# Patient Record
Sex: MALE | Race: WHITE | Employment: FULL TIME | URBAN - METROPOLITAN AREA
[De-identification: names, ages, dates, MRNs, and addresses within clinical notes are randomized per-mention and may not be internally consistent; named-entity substitution may affect disease eponyms.]

---

## 2019-03-19 ENCOUNTER — OFFICE VISIT (OUTPATIENT)
Dept: INTERNAL MEDICINE CLINIC | Age: 54
End: 2019-03-19
Payer: COMMERCIAL

## 2019-03-19 VITALS
HEART RATE: 82 BPM | HEIGHT: 67 IN | WEIGHT: 163 LBS | DIASTOLIC BLOOD PRESSURE: 80 MMHG | BODY MASS INDEX: 25.58 KG/M2 | SYSTOLIC BLOOD PRESSURE: 146 MMHG | RESPIRATION RATE: 16 BRPM

## 2019-03-19 DIAGNOSIS — Z12.11 COLON CANCER SCREENING: ICD-10-CM

## 2019-03-19 DIAGNOSIS — Z12.83 SKIN CANCER SCREENING: ICD-10-CM

## 2019-03-19 DIAGNOSIS — Z00.00 PHYSICAL EXAM: Primary | ICD-10-CM

## 2019-03-19 DIAGNOSIS — I10 ESSENTIAL HYPERTENSION: ICD-10-CM

## 2019-03-19 DIAGNOSIS — Z12.5 PROSTATE CANCER SCREENING: ICD-10-CM

## 2019-03-19 DIAGNOSIS — R10.812 LEFT UPPER QUADRANT ABDOMINAL TENDERNESS WITHOUT REBOUND TENDERNESS: ICD-10-CM

## 2019-03-19 PROCEDURE — 99205 OFFICE O/P NEW HI 60 MIN: CPT | Performed by: NURSE PRACTITIONER

## 2019-03-19 SDOH — HEALTH STABILITY: MENTAL HEALTH: HOW OFTEN DO YOU HAVE A DRINK CONTAINING ALCOHOL?: 2-3 TIMES A WEEK

## 2019-03-19 ASSESSMENT — ENCOUNTER SYMPTOMS
SORE THROAT: 0
VOMITING: 0
DIARRHEA: 0
COLOR CHANGE: 0
PHOTOPHOBIA: 0
FLATUS: 0
BELCHING: 0
CHEST TIGHTNESS: 0
BACK PAIN: 0
SHORTNESS OF BREATH: 0
SINUS PRESSURE: 0
HEMATOCHEZIA: 0
TROUBLE SWALLOWING: 0
NAUSEA: 0
EYE PAIN: 0
WHEEZING: 0
SINUS PAIN: 0
CONSTIPATION: 0
ABDOMINAL PAIN: 0
COUGH: 0

## 2019-03-19 ASSESSMENT — PATIENT HEALTH QUESTIONNAIRE - PHQ9
SUM OF ALL RESPONSES TO PHQ QUESTIONS 1-9: 0
1. LITTLE INTEREST OR PLEASURE IN DOING THINGS: 0
SUM OF ALL RESPONSES TO PHQ QUESTIONS 1-9: 0
SUM OF ALL RESPONSES TO PHQ9 QUESTIONS 1 & 2: 0
2. FEELING DOWN, DEPRESSED OR HOPELESS: 0

## 2019-03-20 ENCOUNTER — NURSE ONLY (OUTPATIENT)
Dept: INTERNAL MEDICINE CLINIC | Age: 54
End: 2019-03-20
Payer: COMMERCIAL

## 2019-03-20 DIAGNOSIS — Z12.5 PROSTATE CANCER SCREENING: ICD-10-CM

## 2019-03-20 DIAGNOSIS — Z00.00 PHYSICAL EXAM: ICD-10-CM

## 2019-03-20 DIAGNOSIS — R10.812 LEFT UPPER QUADRANT ABDOMINAL TENDERNESS WITHOUT REBOUND TENDERNESS: ICD-10-CM

## 2019-03-20 LAB
A/G RATIO: 1.7 (ref 1.1–2.2)
ALBUMIN SERPL-MCNC: 4.4 G/DL (ref 3.4–5)
ALP BLD-CCNC: 70 U/L (ref 40–129)
ALT SERPL-CCNC: 27 U/L (ref 10–40)
ANION GAP SERPL CALCULATED.3IONS-SCNC: 10 MMOL/L (ref 3–16)
AST SERPL-CCNC: 21 U/L (ref 15–37)
BILIRUB SERPL-MCNC: 0.5 MG/DL (ref 0–1)
BUN BLDV-MCNC: 13 MG/DL (ref 7–20)
CALCIUM SERPL-MCNC: 9.7 MG/DL (ref 8.3–10.6)
CHLORIDE BLD-SCNC: 103 MMOL/L (ref 99–110)
CHOLESTEROL, TOTAL: 188 MG/DL (ref 0–199)
CO2: 30 MMOL/L (ref 21–32)
CREAT SERPL-MCNC: 1 MG/DL (ref 0.9–1.3)
GFR AFRICAN AMERICAN: >60
GFR NON-AFRICAN AMERICAN: >60
GLOBULIN: 2.6 G/DL
GLUCOSE BLD-MCNC: 110 MG/DL (ref 70–99)
HCT VFR BLD CALC: 49.7 % (ref 40.5–52.5)
HDLC SERPL-MCNC: 41 MG/DL (ref 40–60)
HEMOGLOBIN: 16.6 G/DL (ref 13.5–17.5)
LDL CHOLESTEROL CALCULATED: 114 MG/DL
LIPASE: 37 U/L (ref 13–60)
MCH RBC QN AUTO: 30.9 PG (ref 26–34)
MCHC RBC AUTO-ENTMCNC: 33.5 G/DL (ref 31–36)
MCV RBC AUTO: 92.2 FL (ref 80–100)
PDW BLD-RTO: 12.9 % (ref 12.4–15.4)
PLATELET # BLD: 290 K/UL (ref 135–450)
PMV BLD AUTO: 8.4 FL (ref 5–10.5)
POTASSIUM SERPL-SCNC: 5 MMOL/L (ref 3.5–5.1)
PROSTATE SPECIFIC ANTIGEN: 1.36 NG/ML (ref 0–4)
RBC # BLD: 5.39 M/UL (ref 4.2–5.9)
SODIUM BLD-SCNC: 143 MMOL/L (ref 136–145)
TOTAL PROTEIN: 7 G/DL (ref 6.4–8.2)
TRIGL SERPL-MCNC: 165 MG/DL (ref 0–150)
VLDLC SERPL CALC-MCNC: 33 MG/DL
WBC # BLD: 6.6 K/UL (ref 4–11)

## 2019-03-20 PROCEDURE — 36415 COLL VENOUS BLD VENIPUNCTURE: CPT | Performed by: NURSE PRACTITIONER

## 2019-03-21 DIAGNOSIS — R73.09 ELEVATED HEMOGLOBIN A1C: ICD-10-CM

## 2019-03-21 DIAGNOSIS — R73.09 ELEVATED HEMOGLOBIN A1C: Primary | ICD-10-CM

## 2019-03-21 LAB
ESTIMATED AVERAGE GLUCOSE: 105.4 MG/DL
HBA1C MFR BLD: 5.3 %

## 2019-04-02 ENCOUNTER — NURSE ONLY (OUTPATIENT)
Dept: INTERNAL MEDICINE CLINIC | Age: 54
End: 2019-04-02
Payer: COMMERCIAL

## 2019-04-02 DIAGNOSIS — Z12.11 COLON CANCER SCREENING: Primary | ICD-10-CM

## 2019-04-02 LAB
CONTROL: NORMAL
HEMOCCULT STL QL: NEGATIVE

## 2019-04-02 PROCEDURE — 82274 ASSAY TEST FOR BLOOD FECAL: CPT | Performed by: NURSE PRACTITIONER

## 2019-06-13 ENCOUNTER — OFFICE VISIT (OUTPATIENT)
Dept: INTERNAL MEDICINE CLINIC | Age: 54
End: 2019-06-13
Payer: COMMERCIAL

## 2019-06-13 VITALS
HEIGHT: 67 IN | OXYGEN SATURATION: 98 % | HEART RATE: 60 BPM | DIASTOLIC BLOOD PRESSURE: 81 MMHG | SYSTOLIC BLOOD PRESSURE: 122 MMHG | WEIGHT: 161 LBS | RESPIRATION RATE: 12 BRPM | TEMPERATURE: 97.5 F | BODY MASS INDEX: 25.27 KG/M2

## 2019-06-13 DIAGNOSIS — I10 ESSENTIAL HYPERTENSION: Primary | ICD-10-CM

## 2019-06-13 DIAGNOSIS — E78.2 MIXED HYPERLIPIDEMIA: ICD-10-CM

## 2019-06-13 PROCEDURE — 99214 OFFICE O/P EST MOD 30 MIN: CPT | Performed by: NURSE PRACTITIONER

## 2019-06-13 ASSESSMENT — ENCOUNTER SYMPTOMS
TROUBLE SWALLOWING: 0
VOMITING: 0
SORE THROAT: 0
CONSTIPATION: 0
PHOTOPHOBIA: 0
COLOR CHANGE: 0
COUGH: 0
SINUS PRESSURE: 0
ORTHOPNEA: 0
NAUSEA: 0
SINUS PAIN: 0
CHEST TIGHTNESS: 0
DIARRHEA: 0
BACK PAIN: 0
EYE PAIN: 0
SHORTNESS OF BREATH: 0
BLURRED VISION: 0
ABDOMINAL PAIN: 0
WHEEZING: 0

## 2019-06-13 NOTE — PROGRESS NOTES
Pt is here for: HTN, HLD    Chief Complaint   Patient presents with    Hypertension     3 month follow up   This is a 49 yr old CM, with a relatively benign past medical hx that presents today for f/u in regards to chronic conditions. Hypertension   This is a chronic problem. The current episode started more than 1 month ago. The problem is unchanged. The problem is controlled. Pertinent negatives include no anxiety, blurred vision, chest pain, headaches, malaise/fatigue, neck pain, orthopnea, palpitations, peripheral edema, PND, shortness of breath or sweats. Risk factors for coronary artery disease include dyslipidemia, male gender and stress. Past treatments include lifestyle changes. The current treatment provides significant improvement. Compliance problems include exercise and diet. There is no history of chronic renal disease. Hyperlipidemia   This is a chronic problem. The current episode started more than 1 year ago. The problem is uncontrolled. He has no history of chronic renal disease, diabetes, hypothyroidism, liver disease, obesity or nephrotic syndrome. Factors aggravating his hyperlipidemia include fatty foods. Pertinent negatives include no chest pain or shortness of breath. Current antihyperlipidemic treatment includes exercise and diet change. The current treatment provides moderate improvement of lipids. Compliance problems include adherence to diet and adherence to exercise. Risk factors for coronary artery disease include family history, dyslipidemia, hypertension, stress and a sedentary lifestyle. .   /81 (Site: Right Upper Arm, Position: Sitting, Cuff Size: Medium Adult)   Pulse 60   Temp 97.5 °F (36.4 °C) (Oral)   Resp 12   Ht 5' 7\" (1.702 m) Comment: shoes on  Wt 161 lb (73 kg) Comment: shoes on  SpO2 98%   BMI 25.22 kg/m²     All questions addressed and answered. Patient agrees with plan of care.    Past Medical History:   Diagnosis Date    Anxiety        No past tenderness. Left sinus exhibits no maxillary sinus tenderness and no frontal sinus tenderness. Mouth/Throat: No oropharyngeal exudate, posterior oropharyngeal edema, posterior oropharyngeal erythema or tonsillar abscesses. Eyes: Pupils are equal, round, and reactive to light. Right eye exhibits no discharge. Left eye exhibits no discharge. Neck: Normal range of motion. No JVD present. No tracheal deviation present. No thyromegaly present. Cardiovascular: Normal rate and regular rhythm. No murmur heard. Pulmonary/Chest: Breath sounds normal. No respiratory distress. He has no wheezes. He has no rales. He exhibits no tenderness. Abdominal: Soft. Bowel sounds are normal. He exhibits no distension and no mass. There is no tenderness. There is no rebound and no guarding. Musculoskeletal: Normal range of motion. He exhibits no edema or tenderness. Lymphadenopathy:     He has no cervical adenopathy. Neurological: He is alert and oriented to person, place, and time. He has normal reflexes. No cranial nerve deficit. Coordination normal.   Skin: Skin is warm and dry. No rash noted. No erythema. Psychiatric: He has a normal mood and affect. His behavior is normal.        Assessment/Plan   Kareen Bunn was seen today for hypertension. Diagnoses and all orders for this visit:    Essential hypertension, stable  -suspect prior elevation r/t acute pain, WNL today, will monitor     Mixed hyperlipidemia, stable  -FLP reviewed, will repeat in 3 months, encouraged on low fat/low chol diet. All questions addressed and answered. Patient agrees with plan of care. Assessment         No follow-ups on file.

## 2019-09-24 ENCOUNTER — NURSE ONLY (OUTPATIENT)
Dept: INTERNAL MEDICINE CLINIC | Age: 54
End: 2019-09-24
Payer: COMMERCIAL

## 2019-09-24 DIAGNOSIS — E78.2 MIXED HYPERLIPIDEMIA: Primary | ICD-10-CM

## 2019-09-24 LAB
CHOLESTEROL, TOTAL: 171 MG/DL (ref 0–199)
HDLC SERPL-MCNC: 40 MG/DL (ref 40–60)
LDL CHOLESTEROL CALCULATED: 99 MG/DL
TRIGL SERPL-MCNC: 162 MG/DL (ref 0–150)
VLDLC SERPL CALC-MCNC: 32 MG/DL

## 2019-09-24 PROCEDURE — 36415 COLL VENOUS BLD VENIPUNCTURE: CPT | Performed by: NURSE PRACTITIONER

## 2020-05-05 ENCOUNTER — VIRTUAL VISIT (OUTPATIENT)
Dept: INTERNAL MEDICINE CLINIC | Age: 55
End: 2020-05-05
Payer: COMMERCIAL

## 2020-05-05 PROCEDURE — 99443 PR PHYS/QHP TELEPHONE EVALUATION 21-30 MIN: CPT | Performed by: NURSE PRACTITIONER

## 2020-05-05 NOTE — PROGRESS NOTES
Tita Dominguez is a 48 y.o. male evaluated via telephone on 5/5/2020. Consent:  He and/or health care decision maker is aware that that he may receive a bill for this telephone service, depending on his insurance coverage, and has provided verbal consent to proceed: Yes    Pursuant to the emergency declaration under the [facility] waiver authority and the [employer] Supplemental Appropriations Act this Telephone Visit was insisted, with patient's consent, to reduce the patient's risk of exposure to COVID-19 and provide continuity of care for an established patient.     Services were provided through a synchronous discussion over a telephone to substitute for in-person clinic visit    Documentation:  I communicated with the patient and/or health care decision maker about HTN, HLD  Details of this discussion including any medical advice provided:     Hyperlipidemia   This problem is chronic and fluctuating. Patient states has been working on his diet and hopes his LDL has improved. . He has no history of chronic renal disease, diabetes, hypothyroidism, liver disease, obesity or nephrotic syndrome. Factors aggravating his hyperlipidemia include fatty foods. Pertinent negatives include no chest pain or shortness of breath. Current antihyperlipidemic treatment includes exercise and diet change. The current treatment provides moderate improvement of lipids. Compliance problems include adherence to diet and adherence to exercise. Risk factors for coronary artery disease include family history, dyslipidemia, hypertension, stress and a sedentary lifestyle. Hypertension   This problem is chronic and stable. Currently diet controlled. Pertinent negatives include no anxiety, blurred vision, chest pain, headaches, malaise/fatigue, neck pain, orthopnea, palpitations, peripheral edema, PND, shortness of breath or sweats.  Risk factors for coronary artery

## 2020-10-14 NOTE — PROGRESS NOTES
10/15/2020    Jyotsna Orourke (:  1965) is a 48 y.o. male, here for evaluation of the following medical concerns:    Chief Complaint   Patient presents with    Annual Exam    Hyperlipidemia    Established New Doctor       HPI   Prior medical records reviewed and lab as well  The pt is a 51YOM with PMH of hyperlipidemia, who presents today for annual physical and to establish care. He is working as a realtor. No smoking, drinks ETOH socially. He is living at home with wife and three kids. He works out six days a week, lift [employer] days and runs three days, 10 miles. He is c/o of  Some difficulty with his hearing of the left ear. Has been there for several months at least but he feels it is getting worse. No ringing in the ears. Review of Systems   Constitutional: Negative for activity change, appetite change, chills, fatigue and fever. HENT: Negative for congestion, ear pain, mouth sores, nosebleeds, sneezing and sore throat. Eyes: Negative for discharge and visual disturbance. Respiratory: Negative for cough, chest tightness, shortness of breath and wheezing. Cardiovascular: Negative for chest pain, palpitations and leg swelling. Gastrointestinal: Negative for abdominal distention, abdominal pain, blood in stool, constipation, diarrhea, nausea and vomiting. Endocrine: Negative for polydipsia, polyphagia and polyuria. Genitourinary: Negative for difficulty urinating and frequency. Musculoskeletal: Negative for arthralgias, back pain, gait problem, myalgias, neck pain and neck stiffness. Skin: Negative for color change, pallor, rash and wound. Neurological: Negative for dizziness, facial asymmetry, speech difficulty, weakness, numbness and headaches. Hematological: Negative for adenopathy. Psychiatric/Behavioral: Negative for agitation, confusion, dysphoric mood, self-injury and suicidal ideas. All other systems reviewed and are negative.       Prior to Visit Medications    Not on File        No Known Allergies    Past Medical History:   Diagnosis Date    Anxiety        History reviewed. No pertinent surgical history. Social History     Tobacco Use    Smoking status: Never Smoker    Smokeless tobacco: Never Used   Substance Use Topics    Alcohol use: Yes     Frequency: 2-3 times a week    Drug use: No         Family History   Problem Relation Age of Onset    Cancer Father         mesothelioma    Prostate Cancer Father     Diabetes Maternal Aunt     Stroke Paternal Grandfather     Breast Cancer Mother     Cancer Brother        Vitals:    10/15/20 1134   BP: 136/81   Site: Left Upper Arm   Position: Sitting   Cuff Size: Large Adult   Pulse: 80   Resp: 16   Temp: 98 °F (36.7 °C)   SpO2: 96%   Weight: 170 lb 3.2 oz (77.2 kg)   Height: 5' 7\" (1.702 m)       Estimated body mass index is 26.66 kg/m² as calculated from the following:    Height as of this encounter: 5' 7\" (1.702 m). Weight as of this encounter: 170 lb 3.2 oz (77.2 kg). Physical Exam  Vitals signs and nursing note reviewed. Constitutional:       Appearance: Normal appearance. He is normal weight. HENT:      Head: Normocephalic and atraumatic. Right Ear: Tympanic membrane and ear canal normal.      Left Ear: Tympanic membrane and ear canal normal.      Nose: Nose normal.      Mouth/Throat:      Mouth: Mucous membranes are moist.      Pharynx: Oropharynx is clear. Eyes:      Extraocular Movements: Extraocular movements intact. Pupils: Pupils are equal, round, and reactive to light. Neck:      Musculoskeletal: Normal range of motion. No neck rigidity or muscular tenderness. Cardiovascular:      Rate and Rhythm: Normal rate and regular rhythm. Pulses: Normal pulses. Pulmonary:      Effort: Pulmonary effort is normal.      Breath sounds: Normal breath sounds. No wheezing or rhonchi. Abdominal:      General: Abdomen is flat. There is no distension.       Palpations: Abdomen is soft. There is no mass. Tenderness: There is no abdominal tenderness. Musculoskeletal: Normal range of motion. Skin:     General: Skin is warm and dry. Neurological:      General: No focal deficit present. Mental Status: He is alert and oriented to person, place, and time. Sensory: No sensory deficit. Motor: No weakness. Psychiatric:         Mood and Affect: Mood normal.         Behavior: Behavior normal.         Thought Content: Thought content normal.         Judgment: Judgment normal.         ASSESSMENT/PLAN:  1. Annual physical exam  Exam performed today    2. Screening for hyperlipidemia    - Lipid, Fasting; Future  - CBC Auto Differential; Future  - CBC Auto Differential  - Lipid, Fasting    3. Screening for diabetes mellitus    - HEMOGLOBIN A1C; Future  - HEMOGLOBIN A1C    4. Screening for colon cancer    - [facility] - Ysabel Durham MD, Gastroenterology[facility]    5. Screening for hypothyroidism    - TSH WITH REFLEX TO FT4; Future  - TSH WITH REFLEX TO FT4    6. Hearing problem of left ear    - Álvaro Diamond, Audiology, [facility]    7. Elevated BP without diagnosis of hypertension    - COMPREHENSIVE METABOLIC PANEL; Future  - COMPREHENSIVE METABOLIC PANEL    8. Screening for prostate cancer    - PSA, Prostatic Specific Antigen; Future  - PSA, Prostatic Specific Antigen    9. Mixed hyperlipidemia  Diet and excersise      No orders of the defined types were placed in this encounter. Return in about 1 year (around 10/15/2021).

## 2020-10-15 ENCOUNTER — OFFICE VISIT (OUTPATIENT)
Dept: INTERNAL MEDICINE CLINIC | Age: 55
End: 2020-10-15
Payer: COMMERCIAL

## 2020-10-15 VITALS
TEMPERATURE: 98 F | HEIGHT: 67 IN | DIASTOLIC BLOOD PRESSURE: 81 MMHG | HEART RATE: 80 BPM | SYSTOLIC BLOOD PRESSURE: 136 MMHG | BODY MASS INDEX: 26.71 KG/M2 | OXYGEN SATURATION: 96 % | WEIGHT: 170.2 LBS | RESPIRATION RATE: 16 BRPM

## 2020-10-15 PROBLEM — E78.2 MIXED HYPERLIPIDEMIA: Status: ACTIVE | Noted: 2020-10-15

## 2020-10-15 LAB
A/G RATIO: 2 (ref 1.1–2.2)
ALBUMIN SERPL-MCNC: 4.6 G/DL (ref 3.4–5)
ALP BLD-CCNC: 80 U/L (ref 40–129)
ALT SERPL-CCNC: 27 U/L (ref 10–40)
ANION GAP SERPL CALCULATED.3IONS-SCNC: 17 MMOL/L (ref 3–16)
AST SERPL-CCNC: 22 U/L (ref 15–37)
BASOPHILS ABSOLUTE: 0 K/UL (ref 0–0.2)
BASOPHILS RELATIVE PERCENT: 0.6 %
BILIRUB SERPL-MCNC: 0.4 MG/DL (ref 0–1)
BUN BLDV-MCNC: 18 MG/DL (ref 7–20)
CALCIUM SERPL-MCNC: 9.5 MG/DL (ref 8.3–10.6)
CHLORIDE BLD-SCNC: 103 MMOL/L (ref 99–110)
CHOLESTEROL, FASTING: 181 MG/DL (ref 0–199)
CO2: 24 MMOL/L (ref 21–32)
CREAT SERPL-MCNC: 1 MG/DL (ref 0.9–1.3)
EOSINOPHILS ABSOLUTE: 0.1 K/UL (ref 0–0.6)
EOSINOPHILS RELATIVE PERCENT: 1.4 %
GFR AFRICAN AMERICAN: >60
GFR NON-AFRICAN AMERICAN: >60
GLOBULIN: 2.3 G/DL
GLUCOSE BLD-MCNC: 87 MG/DL (ref 70–99)
HCT VFR BLD CALC: 46.2 % (ref 40.5–52.5)
HDLC SERPL-MCNC: 37 MG/DL (ref 40–60)
HEMOGLOBIN: 15.8 G/DL (ref 13.5–17.5)
LDL CHOLESTEROL CALCULATED: ABNORMAL MG/DL
LDL CHOLESTEROL DIRECT: 118 MG/DL
LYMPHOCYTES ABSOLUTE: 2.2 K/UL (ref 1–5.1)
LYMPHOCYTES RELATIVE PERCENT: 31 %
MCH RBC QN AUTO: 31.3 PG (ref 26–34)
MCHC RBC AUTO-ENTMCNC: 34.3 G/DL (ref 31–36)
MCV RBC AUTO: 91.3 FL (ref 80–100)
MONOCYTES ABSOLUTE: 0.5 K/UL (ref 0–1.3)
MONOCYTES RELATIVE PERCENT: 7 %
NEUTROPHILS ABSOLUTE: 4.3 K/UL (ref 1.7–7.7)
NEUTROPHILS RELATIVE PERCENT: 60 %
PDW BLD-RTO: 12.6 % (ref 12.4–15.4)
PLATELET # BLD: 270 K/UL (ref 135–450)
PMV BLD AUTO: 8.8 FL (ref 5–10.5)
POTASSIUM SERPL-SCNC: 4.3 MMOL/L (ref 3.5–5.1)
PROSTATE SPECIFIC ANTIGEN: 1.4 NG/ML (ref 0–4)
RBC # BLD: 5.06 M/UL (ref 4.2–5.9)
SODIUM BLD-SCNC: 144 MMOL/L (ref 136–145)
TOTAL PROTEIN: 6.9 G/DL (ref 6.4–8.2)
TRIGLYCERIDE, FASTING: 315 MG/DL (ref 0–150)
TSH REFLEX FT4: 1.45 UIU/ML (ref 0.27–4.2)
VLDLC SERPL CALC-MCNC: ABNORMAL MG/DL
WBC # BLD: 7.1 K/UL (ref 4–11)

## 2020-10-15 PROCEDURE — 90472 IMMUNIZATION ADMIN EACH ADD: CPT | Performed by: INTERNAL MEDICINE

## 2020-10-15 PROCEDURE — 99213 OFFICE O/P EST LOW 20 MIN: CPT | Performed by: INTERNAL MEDICINE

## 2020-10-15 PROCEDURE — 90750 HZV VACC RECOMBINANT IM: CPT | Performed by: INTERNAL MEDICINE

## 2020-10-15 PROCEDURE — 90715 TDAP VACCINE 7 YRS/> IM: CPT | Performed by: INTERNAL MEDICINE

## 2020-10-15 PROCEDURE — 90471 IMMUNIZATION ADMIN: CPT | Performed by: INTERNAL MEDICINE

## 2020-10-15 SDOH — ECONOMIC STABILITY: FOOD INSECURITY: WITHIN THE PAST 12 MONTHS, YOU WORRIED THAT YOUR FOOD WOULD RUN OUT BEFORE YOU GOT MONEY TO BUY MORE.: NEVER TRUE

## 2020-10-15 SDOH — ECONOMIC STABILITY: INCOME INSECURITY: HOW HARD IS IT FOR YOU TO PAY FOR THE VERY BASICS LIKE FOOD, HOUSING, MEDICAL CARE, AND HEATING?: NOT HARD AT ALL

## 2020-10-15 SDOH — ECONOMIC STABILITY: FOOD INSECURITY: WITHIN THE PAST 12 MONTHS, THE FOOD YOU BOUGHT JUST DIDN'T LAST AND YOU DIDN'T HAVE MONEY TO GET MORE.: NEVER TRUE

## 2020-10-15 SDOH — ECONOMIC STABILITY: TRANSPORTATION INSECURITY
IN THE PAST 12 MONTHS, HAS LACK OF TRANSPORTATION KEPT YOU FROM MEETINGS, WORK, OR FROM GETTING THINGS NEEDED FOR DAILY LIVING?: NO

## 2020-10-15 SDOH — ECONOMIC STABILITY: TRANSPORTATION INSECURITY
IN THE PAST 12 MONTHS, HAS THE LACK OF TRANSPORTATION KEPT YOU FROM MEDICAL APPOINTMENTS OR FROM GETTING MEDICATIONS?: NO

## 2020-10-15 ASSESSMENT — PATIENT HEALTH QUESTIONNAIRE - PHQ9
SUM OF ALL RESPONSES TO PHQ9 QUESTIONS 1 & 2: 0
SUM OF ALL RESPONSES TO PHQ QUESTIONS 1-9: 0
SUM OF ALL RESPONSES TO PHQ QUESTIONS 1-9: 0
1. LITTLE INTEREST OR PLEASURE IN DOING THINGS: 0
SUM OF ALL RESPONSES TO PHQ QUESTIONS 1-9: 0
2. FEELING DOWN, DEPRESSED OR HOPELESS: 0

## 2020-10-15 ASSESSMENT — ENCOUNTER SYMPTOMS
VOMITING: 0
BLOOD IN STOOL: 0
NAUSEA: 0
SHORTNESS OF BREATH: 0
CHEST TIGHTNESS: 0
WHEEZING: 0
EYE DISCHARGE: 0
SORE THROAT: 0
CONSTIPATION: 0
ABDOMINAL PAIN: 0
COLOR CHANGE: 0
BACK PAIN: 0
DIARRHEA: 0
COUGH: 0
ABDOMINAL DISTENTION: 0

## 2020-10-16 LAB
ESTIMATED AVERAGE GLUCOSE: 102.5 MG/DL
HBA1C MFR BLD: 5.2 %

## 2020-10-16 RX ORDER — FENOFIBRATE 48 MG/1
48 TABLET, COATED ORAL DAILY
Qty: 30 TABLET | Refills: 3 | Status: SHIPPED | OUTPATIENT
Start: 2020-10-16 | End: 2021-10-26

## 2021-10-26 RX ORDER — IBUPROFEN 200 MG
200 TABLET ORAL EVERY 6 HOURS PRN
COMMUNITY

## 2021-10-26 NOTE — PROGRESS NOTES
[id number]  time_10/29/21 0700___________        Surgery time__0800__________    Take the following medications with a sip of water:    Do not eat or drink anything after 12:00 midnight prior to your surgery. This includes water chewing gum, mints and ice chips. You may brush your teeth and gargle the morning of your surgery, but do not swallow the water     Please see your family doctor/pediatrician for a history and physical and/or concerning medications. Bring any test results/reports from your physicians office. If you are under the care of a heart doctor or specialist doctor, please be aware that you may be asked to them for clearance    You may be asked to stop blood thinners such as Coumadin, Plavix, Fragmin, Lovenox, etc., or any anti-inflammatories such as:  Aspirin, Ibuprofen, Advil, Naproxen prior to your surgery. We also ask that you stop any OTC medications such as fish oil, vitamin E, glucosamine, garlic, Multivitamins, COQ 10, etc.    We ask that you do not smoke 24 hours prior to surgery  We ask that you do not  drink any alcoholic beverages 24 hours prior to surgery     You must make arrangements for a responsible adult to take you home after your surgery. For your safety you will not be allowed to leave alone or drive yourself home. Your surgery will be cancelled if you do not have a ride home. Also for your safety, it is strongly suggested that someone stay with you the first 24 hours after your surgery. A parent or legal guardian must accompany a child scheduled for surgery and plan to stay at the hospital until the child is discharged. Please do not bring other children with you. For your comfort, please wear simple loose fitting clothing to the hospital.  Please do not bring valuables.     Do not wear any make-up or nail polish on your fingers or toes      For your safety, please do not wear any jewelry or body piercing's on the day of surgery. All jewelry must be removed. If you have dentures, they will be removed before going to operating room. For your convenience, we will provide you with a container. If you wear contact lenses or glasses, they will be removed, please bring a case for them. If you have a living will and a durable power of  for healthcare, please bring in a copy. As part of our patient safety program to minimize surgical site infections, we ask you to do the following:    · Please notify your surgeon if you develop any illness between         now and the  day of your surgery. · This includes a cough, cold, fever, sore throat, nausea,         or vomiting, and diarrhea, etc.  ·  Please notify your surgeon if you experience dizziness, shortness         of breath or blurred vision between now and the time of your surgery. Do not shave your operative site 96 hours prior to surgery. For face and neck surgery, men may use an electric razor 48 hours   prior to surgery. You may shower the night before surgery or the morning of   your surgery with an antibacterial soap. You will need to bring a photo ID and insurance card    [facility] has an onsite pharmacy, would you like to utilize our pharmacy     If you will be staying overnight and use a C-pap machine, please bring   your C-pap to hospital     Our goal is to provide you with excellent care, therefore, visitors will be limited to two(2) in the room at a time so that we may focus on providing this care for you. Please contact pre-admission testing if you have any further questions. [facility] phone number:  [phone]  Please note these are generalized instructions for all surgical cases, you may be provided with more specific instructions according to your surgery.

## 2021-10-26 NOTE — FLOWSHEET NOTE
Preoperative Screening for Elective Surgery/Invasive Procedures While COVID-19 present in the community     Have you tested positive or have been told to self-isolate for COVID-19 like symptoms within the past 28 days?  Do you currently have any of the following symptoms? o Fever >100.0 F or 99.9 F in immunocompromised patients? o New onset cough, shortness of breath or difficulty breathing?  o New onset sore throat, myalgia (muscle aches and pains), headache, loss of taste/smell or diarrhea?  Have you had a potential exposure to COVID-19 within the past 14 days by:  o Close contact with a confirmed case? o Close contact with a healthcare worker,  or essential infrastructure worker (grocery store, [employer], gas station, public utilities or transportation)? o Do you reside in a congregate setting such as; skilled nursing facility, adult home, correctional facility, homeless shelter or other institutional setting?  o Have you had recent travel to a known COVID-19 hotspot? Indicate if the patient has a positive screen by answering yes to one or more of the above questions. Patients who test positive or screen positive prior to surgery or on the day of surgery should be evaluated in conjunction with the surgeon/proceduralist/anesthesiologist to determine the urgency of the procedure. covid test 13 days ago-negative.

## 2021-10-28 ENCOUNTER — ANESTHESIA EVENT (OUTPATIENT)
Dept: ENDOSCOPY | Age: 56
End: 2021-10-28
Payer: COMMERCIAL

## 2021-10-29 ENCOUNTER — ANESTHESIA (OUTPATIENT)
Dept: ENDOSCOPY | Age: 56
End: 2021-10-29
Payer: COMMERCIAL

## 2021-10-29 ENCOUNTER — HOSPITAL ENCOUNTER (OUTPATIENT)
Age: 56
Setting detail: OUTPATIENT SURGERY
Discharge: HOME | End: 2021-10-29
Attending: INTERNAL MEDICINE | Admitting: INTERNAL MEDICINE
Payer: COMMERCIAL

## 2021-10-29 VITALS
WEIGHT: 160 LBS | HEIGHT: 67 IN | RESPIRATION RATE: 16 BRPM | HEART RATE: 72 BPM | SYSTOLIC BLOOD PRESSURE: 141 MMHG | TEMPERATURE: 97.7 F | BODY MASS INDEX: 25.11 KG/M2 | OXYGEN SATURATION: 98 % | DIASTOLIC BLOOD PRESSURE: 88 MMHG

## 2021-10-29 VITALS — OXYGEN SATURATION: 96 % | DIASTOLIC BLOOD PRESSURE: 81 MMHG | SYSTOLIC BLOOD PRESSURE: 123 MMHG

## 2021-10-29 PROCEDURE — 3700000001 HC ADD 15 MINUTES (ANESTHESIA): Performed by: INTERNAL MEDICINE

## 2021-10-29 PROCEDURE — 2580000003 HC RX 258: Performed by: STUDENT IN AN ORGANIZED HEALTH CARE EDUCATION/TRAINING PROGRAM

## 2021-10-29 PROCEDURE — 7100000010 HC PHASE II RECOVERY - FIRST 15 MIN: Performed by: INTERNAL MEDICINE

## 2021-10-29 PROCEDURE — 3700000000 HC ANESTHESIA ATTENDED CARE: Performed by: INTERNAL MEDICINE

## 2021-10-29 PROCEDURE — 6360000002 HC RX W HCPCS: Performed by: NURSE ANESTHETIST, CERTIFIED REGISTERED

## 2021-10-29 PROCEDURE — 3609027000 HC COLONOSCOPY: Performed by: INTERNAL MEDICINE

## 2021-10-29 PROCEDURE — 2500000003 HC RX 250 WO HCPCS: Performed by: NURSE ANESTHETIST, CERTIFIED REGISTERED

## 2021-10-29 PROCEDURE — 7100000011 HC PHASE II RECOVERY - ADDTL 15 MIN: Performed by: INTERNAL MEDICINE

## 2021-10-29 RX ORDER — ONDANSETRON 2 MG/ML
4 INJECTION INTRAMUSCULAR; INTRAVENOUS
Status: DISCONTINUED | OUTPATIENT
Start: 2021-10-29 | End: 2021-10-29 | Stop reason: HOSPADM

## 2021-10-29 RX ORDER — PROPOFOL 10 MG/ML
INJECTION, EMULSION INTRAVENOUS PRN
Status: DISCONTINUED | OUTPATIENT
Start: 2021-10-29 | End: 2021-10-29 | Stop reason: SDUPTHER

## 2021-10-29 RX ORDER — PROPOFOL 10 MG/ML
INJECTION, EMULSION INTRAVENOUS CONTINUOUS PRN
Status: DISCONTINUED | OUTPATIENT
Start: 2021-10-29 | End: 2021-10-29 | Stop reason: SDUPTHER

## 2021-10-29 RX ORDER — SODIUM CHLORIDE 0.9 % (FLUSH) 0.9 %
5-40 SYRINGE (ML) INJECTION EVERY 12 HOURS SCHEDULED
Status: DISCONTINUED | OUTPATIENT
Start: 2021-10-29 | End: 2021-10-29 | Stop reason: HOSPADM

## 2021-10-29 RX ORDER — SODIUM CHLORIDE 0.9 % (FLUSH) 0.9 %
5-40 SYRINGE (ML) INJECTION PRN
Status: DISCONTINUED | OUTPATIENT
Start: 2021-10-29 | End: 2021-10-29 | Stop reason: HOSPADM

## 2021-10-29 RX ORDER — SODIUM CHLORIDE 9 MG/ML
25 INJECTION, SOLUTION INTRAVENOUS PRN
Status: DISCONTINUED | OUTPATIENT
Start: 2021-10-29 | End: 2021-10-29 | Stop reason: HOSPADM

## 2021-10-29 RX ORDER — SODIUM CHLORIDE 9 MG/ML
INJECTION, SOLUTION INTRAVENOUS CONTINUOUS
Status: DISCONTINUED | OUTPATIENT
Start: 2021-10-29 | End: 2021-10-29 | Stop reason: HOSPADM

## 2021-10-29 RX ORDER — PROMETHAZINE HYDROCHLORIDE 25 MG/ML
6.25 INJECTION, SOLUTION INTRAMUSCULAR; INTRAVENOUS
Status: DISCONTINUED | OUTPATIENT
Start: 2021-10-29 | End: 2021-10-29 | Stop reason: HOSPADM

## 2021-10-29 RX ORDER — LIDOCAINE HYDROCHLORIDE 20 MG/ML
INJECTION, SOLUTION EPIDURAL; INFILTRATION; INTRACAUDAL; PERINEURAL PRN
Status: DISCONTINUED | OUTPATIENT
Start: 2021-10-29 | End: 2021-10-29 | Stop reason: SDUPTHER

## 2021-10-29 RX ORDER — LABETALOL HYDROCHLORIDE 5 MG/ML
5 INJECTION, SOLUTION INTRAVENOUS EVERY 10 MIN PRN
Status: DISCONTINUED | OUTPATIENT
Start: 2021-10-29 | End: 2021-10-29 | Stop reason: HOSPADM

## 2021-10-29 RX ADMIN — SODIUM CHLORIDE: 9 INJECTION, SOLUTION INTRAVENOUS at 07:32

## 2021-10-29 RX ADMIN — LIDOCAINE HYDROCHLORIDE 50 MG: 20 INJECTION, SOLUTION EPIDURAL; INFILTRATION; INTRACAUDAL; PERINEURAL at 08:07

## 2021-10-29 RX ADMIN — PROPOFOL 80 MG: 10 INJECTION, EMULSION INTRAVENOUS at 08:07

## 2021-10-29 RX ADMIN — PROPOFOL 170 MCG/KG/MIN: 10 INJECTION, EMULSION INTRAVENOUS at 08:08

## 2021-10-29 ASSESSMENT — PAIN SCALES - GENERAL
PAINLEVEL_OUTOF10: 0
PAINLEVEL_OUTOF10: 0

## 2021-10-29 ASSESSMENT — PAIN - FUNCTIONAL ASSESSMENT: PAIN_FUNCTIONAL_ASSESSMENT: 0-10

## 2021-10-29 ASSESSMENT — PAIN SCALES - WONG BAKER: WONGBAKER_NUMERICALRESPONSE: 0

## 2021-10-29 NOTE — ANESTHESIA PRE PROCEDURE
[facility] Department of Anesthesiology  Pre-Anesthesia Evaluation/Consultation       Name:  Jessica Quintero  : 1965  Age:  55 y. o. MRN:  [id number]  Date: 10/29/2021           Surgeon: Surgeon(s):  Efe Pack MD    Procedure: Procedure(s):  COLORECTAL CANCER SCREENING, NOT HIGH RISK     No Known Allergies  Patient Active Problem List   Diagnosis    Mixed hyperlipidemia     Past Medical History:   Diagnosis Date    Anxiety      Past Surgical History:   Procedure Laterality Date    WISDOM TOOTH EXTRACTION       Social History     Tobacco Use    Smoking status: Never Smoker    Smokeless tobacco: Never Used   Vaping Use    Vaping Use: Never used   Substance Use Topics    Alcohol use: Yes    Drug use: No     Medications  No current facility-administered medications on file prior to encounter.      Current Outpatient Medications on File Prior to Encounter   Medication Sig Dispense Refill    ibuprofen (ADVIL;MOTRIN) 200 MG tablet Take 200 mg by mouth every 6 hours as needed for Pain       Current Facility-Administered Medications   Medication Dose Route Frequency Provider Last Rate Last Admin    0.9 % sodium chloride infusion   IntraVENous Continuous Michael Houser MD 75 mL/hr at 10/29/21 0732 New Bag at 10/29/21 0732    sodium chloride flush 0.9 % injection 5-40 mL  5-40 mL IntraVENous 2 times per day Michael Houser MD        sodium chloride flush 0.9 % injection 5-40 mL  5-40 mL IntraVENous PRN Michael Houser MD        0.9 % sodium chloride infusion  25 mL IntraVENous PRN Michael Houser MD         Vital Signs (Current)   Vitals:    10/29/21 0723   BP: (!) 142/78   Pulse: 77   Resp: 16   Temp: 97.6 °F (36.4 °C)   SpO2: 96%     Vital Signs Statistics (for past 48 hrs)     Temp  Av.6 °F (36.4 °C)  Min: 97.6 °F (36.4 °C)   Min taken time: 10/29/21 0723  Max: 97.6 °F (36.4 °C)   Max taken time: 10/29/21 0723  Pulse  Av  Min: 68   Min taken time: 10/29/21 0723  Max: 68   Max taken time: 10/29/21 0723  Resp  Av  Min: 12   Min taken time: 10/29/21 0723  Max: 16   Max taken time: 10/29/21 0723  BP  Min: 142/78   Min taken time: 10/29/21 0723  Max: 142/78   Max taken time: 10/29/21 0723  SpO2  Av %  Min: 96 %   Min taken time: 10/29/21 0723  Max: 96 %   Max taken time: 10/29/21 0723    BP Readings from Last 3 Encounters:   10/29/21 (!) 142/78   10/15/20 136/81   19 122/81     BMI  Body mass index is 25.06 kg/m². Estimated body mass index is 25.06 kg/m² as calculated from the following:    Height as of this encounter: 5' 7\" (1.702 m). Weight as of this encounter: 160 lb (72.6 kg). CBC   Lab Results   Component Value Date    WBC 7.1 10/15/2020    RBC 5.06 10/15/2020    HGB 15.8 10/15/2020    HCT 46.2 10/15/2020    MCV 91.3 10/15/2020    RDW 12.6 10/15/2020     10/15/2020     CMP    Lab Results   Component Value Date     10/15/2020    K 4.3 10/15/2020     10/15/2020    CO2 24 10/15/2020    BUN 18 10/15/2020    CREATININE 1.0 10/15/2020    GFRAA >60 10/15/2020    AGRATIO 2.0 10/15/2020    LABGLOM >60 10/15/2020    GLUCOSE 87 10/15/2020    PROT 6.9 10/15/2020    CALCIUM 9.5 10/15/2020    BILITOT 0.4 10/15/2020    ALKPHOS 80 10/15/2020    AST 22 10/15/2020    ALT 27 10/15/2020     BMP    Lab Results   Component Value Date     10/15/2020    K 4.3 10/15/2020     10/15/2020    CO2 24 10/15/2020    BUN 18 10/15/2020    CREATININE 1.0 10/15/2020    CALCIUM 9.5 10/15/2020    GFRAA >60 10/15/2020    LABGLOM >60 10/15/2020    GLUCOSE 87 10/15/2020     POCGlucose  No results for input(s): GLUCOSE in the last 72 hours.    Coags  No results found for: PROTIME, INR, APTT  HCG (If Applicable) No results found for: PREGTESTUR, PREGSERUM, HCG, HCGQUANT   ABGs No results found for: PHART, PO2ART, EYJ9UTL, IKS8CKW, BEART, A8MEVLZF   Type & Screen (If Applicable)  No results found for: Ruth Kovacs BMI: Wt Readings from Last 3 Encounters:       NPO Status:   Date of last liquid consumption: 10/29/21   Time of last liquid consumption: 0300   Date of last solid food consumption: 10/28/21      Time of last solid consumption: 0800       Anesthesia Evaluation  Patient summary reviewed no history of anesthetic complications:   Airway: Mallampati: III  TM distance: >3 FB   Neck ROM: full   Dental:          Pulmonary:Negative Pulmonary ROS and normal exam                               Cardiovascular:Negative CV ROS  Exercise tolerance: good (>4 METS),         ECG reviewed  Rhythm: regular  Rate: normal           Beta Blocker:  Not on Beta Blocker         Neuro/Psych:   (+) depression/anxiety             GI/Hepatic/Renal: Neg GI/Hepatic/Renal ROS  (+) bowel prep,      (-) GERD       Endo/Other: Negative Endo/Other ROS                    Abdominal:             Vascular: negative vascular ROS. Other Findings:             Anesthesia Plan      MAC     ASA 2       Induction: intravenous. Anesthetic plan and risks discussed with patient. Plan discussed with CRNA. This pre-anesthesia assessment may be used as a history and physical.    DOS STAFF ADDENDUM:    Pt seen and examined, chart reviewed (including anesthesia, drug and allergy history). No interval changes to history and physical examination. Anesthetic plan, risks, benefits, alternatives, and personnel involved discussed with patient. Questions and concerns addressed. Patient(family) verbalized an understanding and agrees to proceed.       Nano Pantoja MD  October 29, 2021  7:34 AM

## 2021-10-29 NOTE — PROCEDURES
[facility]  Endoscopy Note    Patient: Dariana Le  : 1965  Acct#: [de-identified]    Procedure: Colonoscopy     Date:  10/29/2021    Surgeon:  Cami Cameron MD, MD    Referring Physician:  Carrillo Davison    Previous Colonoscopy: No  Date: na  Greater than 3 years? na    Preoperative Diagnosis:  screening    Postoperative Diagnosis:  Normal colon    Anesthesia:  See anesthesia note    Indications: This is a 55y.o. year old male who presents today with screening for colon cancer. Procedure: An informed consent was obtained from the patient after explanation of indications, benefits, possible risks and complications of the procedure. The patient was then taken to the endoscopy suite, placed in the left lateral decubitus position, and the above IV anesthesia was administered. A digital rectal examination was performed and revealed negative without mass, lesions or tenderness. The Olympus CFQ-180-AL video colonoscope was placed in the patient's rectum under digital direction and advanced to the cecum. The cecum was identified by characteristic anatomy and ballottment. The ileocecal valve was identified. The preparation was excellent. The scope was then withdrawn back through the cecum, ascending, transverse, descending and sigmoid colons. Carefull circumferential examination of the mucosa in these areas demonstrated normal colonic mucosa throughout. The scope was then withdrawn into the rectum and retroflexed. The retroflexed view of the anal verge and rectum demonstrates no abnormalities. The scope was straightened, the colon was decompressed and the scope was withdrawn from the patient. The patient tolerated the procedure well and was taken to the PACU in good condition. Estimated Blood Loss:  none    Impression:  Normal colon    Recommendations:  Repeat colonoscopy in 10 years.     Cami Cameron MD, MD   [facility]  10/29/2021

## 2021-10-29 NOTE — ANESTHESIA POSTPROCEDURE EVALUATION
[facility] Department of Anesthesiology  Post-Anesthesia Note       Name:  Matilda Andino                                  Age:  55 y.o. MRN:  [id number]     Last Vitals & Oxygen Saturation: BP (!) 141/88   Pulse 72   Temp 97.7 °F (36.5 °C) (Temporal)   Resp 16   Ht 5' 7\" (1.702 m)   Wt 160 lb (72.6 kg)   SpO2 98%   BMI 25.06 kg/m²   Patient Vitals for the past 4 hrs:   BP Temp Temp src Pulse Resp SpO2 Height Weight   10/29/21 0841 (!) 141/88   72 16 98 %     10/29/21 0831 116/77   73 16 95 %     10/29/21 0821 116/73 97.7 °F (36.5 °C) Temporal 81 16 93 %     10/29/21 0723 (!) 142/78 97.6 °F (36.4 °C) Temporal 77 16 96 % 5' 7\" (1.702 m) 160 lb (72.6 kg)       Level of consciousness:  Awake, alert    Respiratory: Respirations easy, no distress. Stable. Cardiovascular: Hemodynamically stable. Hydration: Adequate. PONV: Adequately managed. Post-op pain: Adequately controlled. Post-op assessment: Tolerated anesthetic well without complication. Complications:  None.     Vesta Rocha MD  October 29, 2021   9:39 AM

## 2022-12-08 ENCOUNTER — OFFICE VISIT (OUTPATIENT)
Dept: PRIMARY CARE CLINIC | Age: 57
End: 2022-12-08

## 2022-12-08 VITALS
HEART RATE: 82 BPM | TEMPERATURE: 98 F | HEIGHT: 66 IN | BODY MASS INDEX: 27.58 KG/M2 | OXYGEN SATURATION: 96 % | DIASTOLIC BLOOD PRESSURE: 66 MMHG | WEIGHT: 171.6 LBS | SYSTOLIC BLOOD PRESSURE: 131 MMHG

## 2022-12-08 DIAGNOSIS — Z80.42 FAMILY HISTORY OF PROSTATE CANCER IN FATHER: ICD-10-CM

## 2022-12-08 DIAGNOSIS — Z00.00 ANNUAL PHYSICAL EXAM: ICD-10-CM

## 2022-12-08 DIAGNOSIS — Z12.5 SCREENING FOR PROSTATE CANCER: ICD-10-CM

## 2022-12-08 DIAGNOSIS — E78.2 MIXED HYPERLIPIDEMIA: ICD-10-CM

## 2022-12-08 DIAGNOSIS — R10.9 RIGHT SIDED ABDOMINAL PAIN: Primary | ICD-10-CM

## 2022-12-08 SDOH — ECONOMIC STABILITY: FOOD INSECURITY: WITHIN THE PAST 12 MONTHS, YOU WORRIED THAT YOUR FOOD WOULD RUN OUT BEFORE YOU GOT MONEY TO BUY MORE.: NEVER TRUE

## 2022-12-08 SDOH — ECONOMIC STABILITY: FOOD INSECURITY: WITHIN THE PAST 12 MONTHS, THE FOOD YOU BOUGHT JUST DIDN'T LAST AND YOU DIDN'T HAVE MONEY TO GET MORE.: NEVER TRUE

## 2022-12-08 ASSESSMENT — PATIENT HEALTH QUESTIONNAIRE - PHQ9
1. LITTLE INTEREST OR PLEASURE IN DOING THINGS: 0
SUM OF ALL RESPONSES TO PHQ QUESTIONS 1-9: 0
SUM OF ALL RESPONSES TO PHQ QUESTIONS 1-9: 0
2. FEELING DOWN, DEPRESSED OR HOPELESS: 0
SUM OF ALL RESPONSES TO PHQ9 QUESTIONS 1 & 2: 0
SUM OF ALL RESPONSES TO PHQ QUESTIONS 1-9: 0
SUM OF ALL RESPONSES TO PHQ QUESTIONS 1-9: 0

## 2022-12-08 ASSESSMENT — ENCOUNTER SYMPTOMS
SHORTNESS OF BREATH: 0
SORE THROAT: 0
NAUSEA: 0
COUGH: 0

## 2022-12-08 ASSESSMENT — SOCIAL DETERMINANTS OF HEALTH (SDOH): HOW HARD IS IT FOR YOU TO PAY FOR THE VERY BASICS LIKE FOOD, HOUSING, MEDICAL CARE, AND HEATING?: NOT HARD AT ALL

## 2022-12-08 NOTE — PROGRESS NOTES
[address] PRIMARY CARE  [address] [place] [address]  Dept: [phone]  Dept Fax: [phone]     12/8/2022      Chan Mame   1965     Chief Complaint   Patient presents with    Abdominal Pain     X 8-9 days        new patient          HPI  Pt comes in today as a NP to establish care. Reports new onset R side (mostly upper) abd pain. Present most of the time - certain times worse than others. No obvious triggers. Bms normal. No upper symptoms. Normal appetite. Thought strained muscle. Had some coughing before this - harsh. Improved overall. Present now for 1-2 weeks. In addition to this, would like to complete wellness. Reviewed chart and past hx. No labs recently. PHQ Scores 12/8/2022 10/15/2020 3/19/2019   PHQ2 Score 0 0 0   PHQ9 Score 0 0 0     Interpretation of Total Score Depression Severity: 1-4 = Minimal depression, 5-9 = Mild depression, 10-14 = Moderate depression, 15-19 = Moderately severe depression, 20-27 = Severe depression     Prior to Visit Medications    Not on File       Past Medical History:   Diagnosis Date    Mixed hyperlipidemia 10/15/2020        Social History     Tobacco Use    Smoking status: Never    Smokeless tobacco: Never   Vaping Use    Vaping Use: Never used   Substance Use Topics    Alcohol use: Yes    Drug use: No        Past Surgical History:   Procedure Laterality Date    COLONOSCOPY N/A 10/29/2021    COLORECTAL CANCER SCREENING, NOT HIGH RISK performed by Kenny Oneill MD at [address] [facility]          No Known Allergies     Family History   Problem Relation Age of Onset    Breast Cancer Mother     Cancer Father         mesothelioma    Prostate Cancer Father     Cancer Brother         Lymphoma    Stroke Paternal Grandfather         Patient's past medical history, surgical history, family history, medications, and allergies  were all reviewed and updated as appropriate today.     Review of Systems   Constitutional:  Negative for fatigue, fever and unexpected weight change. HENT:  Negative for congestion, ear pain and sore throat. Eyes:  Negative for pain, itching and visual disturbance. Respiratory:  Negative for cough, shortness of breath and wheezing. Cardiovascular:  Negative for chest pain, palpitations and leg swelling. Gastrointestinal:  Positive for abdominal pain. Negative for constipation, diarrhea, nausea and vomiting. Endocrine: Negative for cold intolerance, heat intolerance, polydipsia and polyuria. Genitourinary:  Negative for dysuria, frequency and hematuria. Musculoskeletal:  Negative for arthralgias and joint swelling. Skin:  Negative for rash. Neurological:  Negative for dizziness and headaches. Hematological:  Negative for adenopathy. /66 (Cuff Size: Medium Adult)   Pulse 82   Temp 98 °F (36.7 °C) (Oral)   Ht 5' 5.5\" (1.664 m)   Wt 171 lb 9.6 oz (77.8 kg)   SpO2 96% Comment: room air  BMI 28.12 kg/m²      Physical Exam  Vitals reviewed. Constitutional:       General: He is not in acute distress. Appearance: Normal appearance. He is well-developed and normal weight. HENT:      Head: Normocephalic and atraumatic. Right Ear: Tympanic membrane and ear canal normal. No drainage. No middle ear effusion. Tympanic membrane is not erythematous. Left Ear: Tympanic membrane and ear canal normal. No drainage. No middle ear effusion. Tympanic membrane is not erythematous. Nose: Nose normal. No rhinorrhea. Mouth/Throat:      Mouth: Mucous membranes are moist.      Pharynx: No oropharyngeal exudate or posterior oropharyngeal erythema. Eyes:      Extraocular Movements: Extraocular movements intact. Pupils: Pupils are equal, round, and reactive to light. Neck:      Thyroid: No thyromegaly. Cardiovascular:      Rate and Rhythm: Normal rate and regular rhythm. Heart sounds: No murmur heard.   Pulmonary:      Effort: Pulmonary effort is normal.      Breath sounds: Normal breath sounds. No wheezing. Abdominal:      General: Bowel sounds are normal.      Palpations: Abdomen is soft. There is no mass. Tenderness: There is no abdominal tenderness. Musculoskeletal:         General: No swelling or deformity. Normal range of motion. Cervical back: Neck supple. Lymphadenopathy:      Cervical: No cervical adenopathy. Skin:     General: Skin is warm and dry. Findings: No rash. Neurological:      General: No focal deficit present. Mental Status: He is alert and oriented to person, place, and time. Cranial Nerves: No cranial nerve deficit. Psychiatric:         Mood and Affect: Mood normal.         Behavior: Behavior is cooperative. Assessment:  Encounter Diagnoses   Name Primary? Right sided abdominal pain Yes    Annual physical exam     Screening for prostate cancer     Family history of prostate cancer in father     Mixed hyperlipidemia        Plan:  1. Right sided abdominal pain  NP with acute R sided abd pain. Nonspecific. No red flag symptoms. Reassurance given. Discussed precautions. F/U prn.    2. Annual physical exam  General wellness exam. Reviewed chart for past hx and updated today. Counseled on age appropriate health guidance and discussed screening recommendations. Vaccinations reviewed and discussed. All questions answered  - CBC with Auto Differential; Future  - Comprehensive Metabolic Panel, Fasting; Future  - Lipid, Fasting; Future  - TSH with Reflex; Future    3. Screening for prostate cancer  Discussed prostate cancer screening with male of appropriate age and risk factors. I have provided evidence behind PSA and answered all questions regarding the sensitivity of this test.  At this time, through shared decision making, patient would like to move forward with collection of annual PSA.  - PSA Screening; Future    4. Family history of prostate cancer in father    11.  Mixed hyperlipidemia    Return in about 1 year (around 12/8/2023) for Physical.               Anam Mike, DO     Please note that this chart was generated using dragon dictation software. Although every effort was made to ensure the accuracy of this automated transcription, some errors in transcription may have occurred.

## 2022-12-08 NOTE — PATIENT INSTRUCTIONS
[facility] Laboratory Locations - No appointment necessary. @ indicates the location is open Saturdays in addition to Monday through Friday. Call your preferred location for test preparation, business hours and other information you need. [employer] accepts [employer]. [facility]    @ [place] [facility] Svcs. [address] [facility] [address] [place] [address]. [place]   Ph: [phone] [facility] Lab Svcs. [address]   Ph: [phone]  @ [place] [facility] Svcs. [address] [facility]   Ph: [phone]    [facility] Svcs. [address] [facility] [place], [address]   Ph: [phone] @ [place] [facility] Svcs. [address] [facility] 1102 [facility], [address]  Ph: [phone] @ [facility] Lab Svcs. [address] [place] [address]. [address]   Ph: [phone]     [facility] Svcs. [address]  Ph: [phone]    [place]   @ [place] Lab Svcs. [address] [facility] [place], [place] [address]   Ph: [phone] [facility] Med. Office Bldg. 69[address] [facility], [address]  Ph: [phone] [facility] [address], [phone]   [address] Lab Svcs. 54 [facility]   Ph: [phone] [facility] Lab Svcs.   [address] [facility], [address] [place] [address]   Ph: [phone]

## 2022-12-11 ASSESSMENT — ENCOUNTER SYMPTOMS
VOMITING: 0
EYE ITCHING: 0
CONSTIPATION: 0
EYE PAIN: 0
DIARRHEA: 0
WHEEZING: 0
ABDOMINAL PAIN: 1

## 2023-01-01 NOTE — H&P
[facility]   Pre-operative History and Physical    Patient: Edson Murguia  : 1965  Acct#: [de-identified]    History Obtained From: electronic medical record    HISTORY OF PRESENT ILLNESS  Procedure:Colonoscopy  Indications:screening  Past Medical History:        Diagnosis Date    Anxiety      Past Surgical History:        Procedure Laterality Date    WISDOM TOOTH EXTRACTION       Medications prior to admission:   Prior to Admission medications    Medication Sig Start Date End Date Taking? Authorizing Provider   ibuprofen (ADVIL;MOTRIN) 200 MG tablet Take 200 mg by mouth every 6 hours as needed for Pain   Yes Historical Provider, MD     Allergies:   Patient has no known allergies. Social History     Socioeconomic History    Marital status:      Spouse name: Not on file    Number of children: Not on file    Years of education: Not on file    Highest education level: Not on file   Occupational History    Occupation: realtor,     Tobacco Use    Smoking status: Never Smoker    Smokeless tobacco: Never Used   Vaping Use    Vaping Use: Never used   Substance and Sexual Activity    Alcohol use: Yes    Drug use: No    Sexual activity: Yes     Partners: Female   Other Topics Concern    Not on file   Social History Narrative    Not on file     Social Determinants of Health     Financial Resource Strain:     Difficulty of Paying Living Expenses:    Food Insecurity:     Worried About Running Out of Food in the Last Year:     [address] in the Last Year:    Transportation Needs:     Lack of Transportation (Medical):      Lack of Transportation (Non-Medical):    Physical Activity:     Days of Exercise per Week:     Minutes of Exercise per Session:    Stress:     Feeling of Stress :    Social Connections:     Frequency of Communication with Friends and Family:     Frequency of Social Gatherings with Friends and Family:     Attends Taoism Services:     Active Member of Clubs or Organizations:     Attends Club or Organization Meetings:     Marital Status:    Intimate Partner Violence:     Fear of Current or Ex-Partner:     Emotionally Abused:     Physically Abused:     Sexually Abused:      Family History   Problem Relation Age of Onset    Cancer Father         mesothelioma    Prostate Cancer Father     Diabetes Maternal Aunt     Stroke Paternal Grandfather     Breast Cancer Mother     Cancer Brother          PHYSICAL EXAM:      BP (!) 142/78   Pulse 77   Temp 97.6 °F (36.4 °C) (Temporal)   Resp 16   Ht 5' 7\" (1.702 m)   Wt 160 lb (72.6 kg)   SpO2 96%   BMI 25.06 kg/m²  I        Heart:normal    Lungs: normal    Abdomen: normal      ASA Grade:  See anesthesia note      ASSESSMENT AND PLAN:    1. Procedure options, risks and benefits reviewed with patient and expresses understanding. Offered, feeding was successful

## 2024-01-18 ENCOUNTER — OFFICE VISIT (OUTPATIENT)
Dept: PRIMARY CARE CLINIC | Age: 59
End: 2024-01-18
Payer: COMMERCIAL

## 2024-01-18 VITALS
SYSTOLIC BLOOD PRESSURE: 138 MMHG | DIASTOLIC BLOOD PRESSURE: 70 MMHG | OXYGEN SATURATION: 98 % | WEIGHT: 173.6 LBS | HEIGHT: 66 IN | BODY MASS INDEX: 27.9 KG/M2 | TEMPERATURE: 97.8 F | HEART RATE: 66 BPM

## 2024-01-18 DIAGNOSIS — Z12.5 SCREENING FOR PROSTATE CANCER: ICD-10-CM

## 2024-01-18 DIAGNOSIS — E78.2 MIXED HYPERLIPIDEMIA: ICD-10-CM

## 2024-01-18 DIAGNOSIS — Z85.828 HISTORY OF SKIN CANCER: ICD-10-CM

## 2024-01-18 DIAGNOSIS — Z23 NEED FOR SHINGLES VACCINE: ICD-10-CM

## 2024-01-18 DIAGNOSIS — Z80.42 FAMILY HISTORY OF PROSTATE CANCER IN FATHER: ICD-10-CM

## 2024-01-18 DIAGNOSIS — Z00.00 ANNUAL PHYSICAL EXAM: Primary | ICD-10-CM

## 2024-01-18 PROCEDURE — 99396 PREV VISIT EST AGE 40-64: CPT | Performed by: FAMILY MEDICINE

## 2024-01-18 PROCEDURE — 90750 HZV VACC RECOMBINANT IM: CPT | Performed by: FAMILY MEDICINE

## 2024-01-18 PROCEDURE — 90471 IMMUNIZATION ADMIN: CPT | Performed by: FAMILY MEDICINE

## 2024-01-18 SDOH — ECONOMIC STABILITY: FOOD INSECURITY: WITHIN THE PAST 12 MONTHS, THE FOOD YOU BOUGHT JUST DIDN'T LAST AND YOU DIDN'T HAVE MONEY TO GET MORE.: NEVER TRUE

## 2024-01-18 SDOH — ECONOMIC STABILITY: HOUSING INSECURITY
IN THE LAST 12 MONTHS, WAS THERE A TIME WHEN YOU DID NOT HAVE A STEADY PLACE TO SLEEP OR SLEPT IN A SHELTER (INCLUDING NOW)?: NO

## 2024-01-18 SDOH — ECONOMIC STABILITY: FOOD INSECURITY: WITHIN THE PAST 12 MONTHS, YOU WORRIED THAT YOUR FOOD WOULD RUN OUT BEFORE YOU GOT MONEY TO BUY MORE.: NEVER TRUE

## 2024-01-18 SDOH — ECONOMIC STABILITY: INCOME INSECURITY: HOW HARD IS IT FOR YOU TO PAY FOR THE VERY BASICS LIKE FOOD, HOUSING, MEDICAL CARE, AND HEATING?: NOT HARD AT ALL

## 2024-01-18 ASSESSMENT — PATIENT HEALTH QUESTIONNAIRE - PHQ9
SUM OF ALL RESPONSES TO PHQ QUESTIONS 1-9: 0
SUM OF ALL RESPONSES TO PHQ9 QUESTIONS 1 & 2: 0
SUM OF ALL RESPONSES TO PHQ QUESTIONS 1-9: 0
2. FEELING DOWN, DEPRESSED OR HOPELESS: 0
1. LITTLE INTEREST OR PLEASURE IN DOING THINGS: 0

## 2024-01-18 ASSESSMENT — ENCOUNTER SYMPTOMS
NAUSEA: 0
ABDOMINAL PAIN: 0
SORE THROAT: 0
COUGH: 0
SHORTNESS OF BREATH: 0

## 2024-01-18 NOTE — PATIENT INSTRUCTIONS
[facility] Laboratory Locations - No appointment necessary.  ? indicates the location is open Saturdays in addition to Monday through Friday.   Call your preferred location for test preparation, business hours and other information you need.   [facility] accepts all insurances.  [place]    ? [place]   [address]   [address]   [place] [address]    Ph: [phone]  [facility]   [address] [place] [address]    Ph: [phone]   ? [place]   [address]    [place] [address]    Ph: [phone]     [facility]   [address] [place] [address]    Ph: [phone] ? [address] [place] [address]   Ph: [phone]  ? [facility]   [address] [facility] [place] [place] [address]    Ph: [phone]      [place]   [address] [address] [place] [address]   Ph: [phone]    NORTH    ? [facility]   [address] [facility] [place] [address]    Ph: [phone]  [facility]   [address] [place] [address]   Ph: [phone]  [place]   [address] [facility], [address]    PH: [phone]    [facility]   [address] [place] [address]    Ph: [phone]  [place]  [address] [place] [address]  Ph: [phone]  [facility]   [address] [place] [address]    Ph: [phone]

## 2024-01-18 NOTE — PROGRESS NOTES
[facility]  [facility] PRIMARY CARE  [address] [place] [address]  Dept: [phone]  Dept Fax: [phone]     1/18/2024      Montrell Skinner   1965     Chief Complaint   Patient presents with    Annual Exam       HPI  Pt comes in today for physical. Diagnosed with skin cancer on the back since our last visit. No acute concerns.         1/18/2024     1:31 PM 12/8/2022     3:27 PM 10/15/2020    11:33 AM 3/19/2019     1:23 PM   PHQ Scores   PHQ2 Score 0 0 0 0   PHQ9 Score 0 0 0 0     Interpretation of Total Score Depression Severity: 1-4 = Minimal depression, 5-9 = Mild depression, 10-14 = Moderate depression, 15-19 = Moderately severe depression, 20-27 = Severe depression     Prior to Visit Medications    Not on File       Past Medical History:   Diagnosis Date    History of skin cancer - basal cell 01/18/2024    Mixed hyperlipidemia 10/15/2020        Social History     Tobacco Use    Smoking status: Never    Smokeless tobacco: Never   Vaping Use    Vaping Use: Never used   Substance Use Topics    Alcohol use: Yes    Drug use: No        Past Surgical History:   Procedure Laterality Date    COLONOSCOPY N/A 10/29/2021    COLORECTAL CANCER SCREENING, NOT HIGH RISK performed by Andrew Flaherty MD at [facility] ENDOSCOPY    WISDOM TOOTH EXTRACTION          No Known Allergies     Family History   Problem Relation Age of Onset    Breast Cancer Mother     Cancer Father         mesothelioma    Prostate Cancer Father     Cancer Brother         Lymphoma    Stroke Paternal Grandfather         Patient's past medical history, surgical history, family history, medications, and allergies  were all reviewed and updated as appropriate today.    Review of Systems   Constitutional:  Negative for fever.   HENT:  Negative for ear pain and sore throat.    Respiratory:  Negative for cough and shortness of breath.    Cardiovascular:  Negative for chest pain.   Gastrointestinal:  Negative for

## 2025-07-22 ENCOUNTER — APPOINTMENT (OUTPATIENT)
Dept: GENERAL RADIOLOGY | Age: 60
End: 2025-07-22
Payer: COMMERCIAL

## 2025-07-22 ENCOUNTER — HOSPITAL ENCOUNTER (EMERGENCY)
Age: 60
Discharge: HOME | End: 2025-07-22
Attending: EMERGENCY MEDICINE
Payer: COMMERCIAL

## 2025-07-22 VITALS
RESPIRATION RATE: 18 BRPM | BODY MASS INDEX: 25.99 KG/M2 | OXYGEN SATURATION: 100 % | HEIGHT: 67 IN | DIASTOLIC BLOOD PRESSURE: 79 MMHG | WEIGHT: 165.57 LBS | TEMPERATURE: 97.7 F | HEART RATE: 57 BPM | SYSTOLIC BLOOD PRESSURE: 139 MMHG

## 2025-07-22 DIAGNOSIS — R07.9 CHEST PAIN, UNSPECIFIED TYPE: Primary | ICD-10-CM

## 2025-07-22 LAB
ALBUMIN SERPL-MCNC: 4.1 G/DL (ref 3.4–5)
ALBUMIN/GLOB SERPL: 1.5 {RATIO} (ref 1.1–2.2)
ALP SERPL-CCNC: 71 U/L (ref 40–129)
ALT SERPL-CCNC: 22 U/L (ref 10–40)
ANION GAP SERPL CALCULATED.3IONS-SCNC: 11 MMOL/L (ref 3–16)
AST SERPL-CCNC: 28 U/L (ref 15–37)
BASOPHILS # BLD: 0 K/UL (ref 0–0.2)
BASOPHILS NFR BLD: 0.7 %
BILIRUB SERPL-MCNC: 0.6 MG/DL (ref 0–1)
BUN SERPL-MCNC: 19 MG/DL (ref 7–20)
CALCIUM SERPL-MCNC: 9.5 MG/DL (ref 8.3–10.6)
CHLORIDE SERPL-SCNC: 103 MMOL/L (ref 99–110)
CO2 SERPL-SCNC: 25 MMOL/L (ref 21–32)
CREAT SERPL-MCNC: 1.1 MG/DL (ref 0.9–1.3)
DEPRECATED RDW RBC AUTO: 13.3 % (ref 12.4–15.4)
EOSINOPHIL # BLD: 0.1 K/UL (ref 0–0.6)
EOSINOPHIL NFR BLD: 1.4 %
GFR SERPLBLD CREATININE-BSD FMLA CKD-EPI: 77 ML/MIN/{1.73_M2}
GLUCOSE SERPL-MCNC: 103 MG/DL (ref 70–99)
HCT VFR BLD AUTO: 46.1 % (ref 40.5–52.5)
HGB BLD-MCNC: 15.8 G/DL (ref 13.5–17.5)
LYMPHOCYTES # BLD: 1.9 K/UL (ref 1–5.1)
LYMPHOCYTES NFR BLD: 36.3 %
MCH RBC QN AUTO: 31 PG (ref 26–34)
MCHC RBC AUTO-ENTMCNC: 34.3 G/DL (ref 31–36)
MCV RBC AUTO: 90.4 FL (ref 80–100)
MONOCYTES # BLD: 0.4 K/UL (ref 0–1.3)
MONOCYTES NFR BLD: 7.1 %
NEUTROPHILS # BLD: 2.9 K/UL (ref 1.7–7.7)
NEUTROPHILS NFR BLD: 54.5 %
PLATELET # BLD AUTO: 228 K/UL (ref 135–450)
PMV BLD AUTO: 8.4 FL (ref 5–10.5)
POTASSIUM SERPL-SCNC: 3.9 MMOL/L (ref 3.5–5.1)
PROT SERPL-MCNC: 6.8 G/DL (ref 6.4–8.2)
RBC # BLD AUTO: 5.09 M/UL (ref 4.2–5.9)
SODIUM SERPL-SCNC: 139 MMOL/L (ref 136–145)
TROPONIN, HIGH SENSITIVITY: <6 NG/L (ref 0–22)
WBC # BLD AUTO: 5.2 K/UL (ref 4–11)

## 2025-07-22 PROCEDURE — 71046 X-RAY EXAM CHEST 2 VIEWS: CPT

## 2025-07-22 PROCEDURE — 84484 ASSAY OF TROPONIN QUANT: CPT

## 2025-07-22 PROCEDURE — 80053 COMPREHEN METABOLIC PANEL: CPT

## 2025-07-22 PROCEDURE — 85025 COMPLETE CBC W/AUTO DIFF WBC: CPT

## 2025-07-22 PROCEDURE — 93005 ELECTROCARDIOGRAM TRACING: CPT | Performed by: EMERGENCY MEDICINE

## 2025-07-22 PROCEDURE — 99285 EMERGENCY DEPT VISIT HI MDM: CPT

## 2025-07-22 ASSESSMENT — PAIN DESCRIPTION - LOCATION: LOCATION: CHEST;NECK

## 2025-07-22 ASSESSMENT — PAIN SCALES - GENERAL
PAINLEVEL_OUTOF10: 0
PAINLEVEL_OUTOF10: 0
PAINLEVEL_OUTOF10: 4

## 2025-07-22 ASSESSMENT — PAIN DESCRIPTION - DESCRIPTORS: DESCRIPTORS: TIGHTNESS

## 2025-07-22 ASSESSMENT — PAIN DESCRIPTION - PAIN TYPE: TYPE: ACUTE PAIN

## 2025-07-22 ASSESSMENT — PAIN - FUNCTIONAL ASSESSMENT
PAIN_FUNCTIONAL_ASSESSMENT: 0-10
PAIN_FUNCTIONAL_ASSESSMENT: 0-10
PAIN_FUNCTIONAL_ASSESSMENT: ACTIVITIES ARE NOT PREVENTED

## 2025-07-22 ASSESSMENT — PAIN DESCRIPTION - FREQUENCY: FREQUENCY: INTERMITTENT

## 2025-07-22 ASSESSMENT — LIFESTYLE VARIABLES
HOW OFTEN DO YOU HAVE A DRINK CONTAINING ALCOHOL: 2-4 TIMES A MONTH
HOW MANY STANDARD DRINKS CONTAINING ALCOHOL DO YOU HAVE ON A TYPICAL DAY: 3 OR 4

## 2025-07-22 ASSESSMENT — PAIN DESCRIPTION - ORIENTATION: ORIENTATION: LEFT

## 2025-07-22 NOTE — DISCHARGE INSTRUCTIONS
Your blood work is overall normal.  Please follow-up with your primary care doctor to discuss outpatient stress testing.  Return for worsening symptoms

## 2025-07-22 NOTE — ED TRIAGE NOTES
Pt states that he has been experiencing left sided chest pain up into left side of neck since Sunday. Pt denies SOB. Pt denies cardiac hx. Pt denies nausea/vomiting. Pt states that he was doing yard work this past weekend and was eating different foods; experienced episode of heart burn on Sunday evening. Pt states that he experienced some relief to chest with massage on Sunday, but nothing helps pain today. Pt AAO x 4. VSS.

## 2025-07-22 NOTE — ED PROVIDER NOTES
pain, unspecified type          DISPOSITION/PLAN     DISPOSITION Decision To Discharge 07/22/2025 10:13:10 AM   DISPOSITION CONDITION Stable           PATIENT REFERRED TO:  Obi Whiting DO  [address] [facility] [id number] [phone]            DISCHARGE MEDICATIONS:  Patient was given scripts for the following medications. I counseled patient how to take these medications:  New Prescriptions    No medications on file       DISCONTINUED MEDICATIONS:  Discontinued Medications    No medications on file              (This chart was generated in part by using Dragon Dictation system and may contain errors related to that system including errors in grammar, punctuation, and spelling, as well as words and phrases that may be inappropriate. If there are any questions or concerns please feel free to contact the dictating provider for clarification.)    MD Hernandez Ivy Benjamin, MD  07/22/25 3571

## 2025-07-23 ENCOUNTER — TELEPHONE (OUTPATIENT)
Dept: PRIMARY CARE CLINIC | Age: 60
End: 2025-07-23

## 2025-07-23 LAB
EKG ATRIAL RATE: 64 BPM
EKG DIAGNOSIS: NORMAL
EKG P AXIS: 20 DEGREES
EKG P-R INTERVAL: 122 MS
EKG Q-T INTERVAL: 390 MS
EKG QRS DURATION: 100 MS
EKG QTC CALCULATION (BAZETT): 402 MS
EKG R AXIS: 40 DEGREES
EKG T AXIS: 61 DEGREES
EKG VENTRICULAR RATE: 64 BPM

## 2025-07-23 PROCEDURE — 93010 ELECTROCARDIOGRAM REPORT: CPT | Performed by: STUDENT IN AN ORGANIZED HEALTH CARE EDUCATION/TRAINING PROGRAM

## 2025-07-31 ENCOUNTER — OFFICE VISIT (OUTPATIENT)
Dept: PRIMARY CARE CLINIC | Age: 60
End: 2025-07-31
Payer: COMMERCIAL

## 2025-07-31 VITALS
BODY MASS INDEX: 26.24 KG/M2 | OXYGEN SATURATION: 98 % | HEART RATE: 65 BPM | DIASTOLIC BLOOD PRESSURE: 74 MMHG | SYSTOLIC BLOOD PRESSURE: 129 MMHG | WEIGHT: 167.2 LBS | TEMPERATURE: 97.7 F | HEIGHT: 67 IN

## 2025-07-31 DIAGNOSIS — Z12.5 SCREENING FOR PROSTATE CANCER: ICD-10-CM

## 2025-07-31 DIAGNOSIS — Z00.00 ANNUAL PHYSICAL EXAM: Primary | ICD-10-CM

## 2025-07-31 DIAGNOSIS — R07.89 ATYPICAL CHEST PAIN: ICD-10-CM

## 2025-07-31 DIAGNOSIS — E78.2 MIXED HYPERLIPIDEMIA: ICD-10-CM

## 2025-07-31 DIAGNOSIS — Z85.828 HISTORY OF SKIN CANCER: ICD-10-CM

## 2025-07-31 DIAGNOSIS — R73.9 ELEVATED SERUM GLUCOSE: ICD-10-CM

## 2025-07-31 PROCEDURE — 99396 PREV VISIT EST AGE 40-64: CPT | Performed by: FAMILY MEDICINE

## 2025-07-31 PROCEDURE — 99213 OFFICE O/P EST LOW 20 MIN: CPT | Performed by: FAMILY MEDICINE

## 2025-07-31 SDOH — ECONOMIC STABILITY: FOOD INSECURITY: WITHIN THE PAST 12 MONTHS, THE FOOD YOU BOUGHT JUST DIDN'T LAST AND YOU DIDN'T HAVE MONEY TO GET MORE.: NEVER TRUE

## 2025-07-31 SDOH — ECONOMIC STABILITY: FOOD INSECURITY: WITHIN THE PAST 12 MONTHS, YOU WORRIED THAT YOUR FOOD WOULD RUN OUT BEFORE YOU GOT MONEY TO BUY MORE.: NEVER TRUE

## 2025-07-31 ASSESSMENT — PATIENT HEALTH QUESTIONNAIRE - PHQ9
2. FEELING DOWN, DEPRESSED OR HOPELESS: NOT AT ALL
SUM OF ALL RESPONSES TO PHQ QUESTIONS 1-9: 0
1. LITTLE INTEREST OR PLEASURE IN DOING THINGS: NOT AT ALL
SUM OF ALL RESPONSES TO PHQ QUESTIONS 1-9: 0

## 2025-07-31 ASSESSMENT — ENCOUNTER SYMPTOMS
WHEEZING: 0
COUGH: 0
EYE ITCHING: 0
SHORTNESS OF BREATH: 0
ABDOMINAL PAIN: 0
DIARRHEA: 0
VOMITING: 0
EYE PAIN: 0
NAUSEA: 0
SORE THROAT: 0
CONSTIPATION: 0

## 2025-07-31 NOTE — PROGRESS NOTES
[facility]  [facility] PRIMARY CARE  [address] [place] [address]  Dept: [phone]  Dept Fax: [phone]     7/31/2025      Montrell Skinner   1965     Chief Complaint   Patient presents with    Annual Exam     Physical       HPI  Pt comes in today for physical. Last visit together was Jane 2024. He has not completed wellness labs the last 2 times we ordered. Acutely, he was seen in ER about 5 days ago for atypical chest pain - this has completely resolved. He believes this was MSK in nature - had increase in activity prior to this. ER eval with normal basic labs, CXRAY and EKG. He has been active and exerting himself since the CP resolved, not concerning symptoms.         7/31/2025    10:02 AM 1/18/2024     1:31 PM 12/8/2022     3:27 PM 10/15/2020    11:33 AM 3/19/2019     1:23 PM   PHQ Scores   PHQ2 Score 0 0 0 0 0   PHQ9 Score 0 0 0 0 0     Interpretation of Total Score Depression Severity: 1-4 = Minimal depression, 5-9 = Mild depression, 10-14 = Moderate depression, 15-19 = Moderately severe depression, 20-27 = Severe depression     Prior to Visit Medications    Not on File       Past Medical History:   Diagnosis Date    History of skin cancer - basal cell 01/18/2024    Mixed hyperlipidemia 10/15/2020        Social History     Tobacco Use    Smoking status: Never    Smokeless tobacco: Never   Vaping Use    Vaping status: Never Used   Substance Use Topics    Alcohol use: Yes    Drug use: No        Past Surgical History:   Procedure Laterality Date    COLONOSCOPY N/A 10/29/2021    COLORECTAL CANCER SCREENING, NOT HIGH RISK performed by Andrew Flaherty MD at [facility] ENDOSCOPY    WISDOM TOOTH EXTRACTION          No Known Allergies     Family History   Problem Relation Age of Onset    Breast Cancer Mother     Cancer Father         mesothelioma    Prostate Cancer Father     Cancer Brother         Lymphoma    Stroke Paternal Grandfather         Patient's past medical 
Registered Nurse visit. Home Physical Therapy.

## 2025-08-07 DIAGNOSIS — Z00.00 ANNUAL PHYSICAL EXAM: ICD-10-CM

## 2025-08-07 DIAGNOSIS — Z12.5 SCREENING FOR PROSTATE CANCER: ICD-10-CM

## 2025-08-07 DIAGNOSIS — R73.9 ELEVATED SERUM GLUCOSE: ICD-10-CM

## 2025-08-07 LAB
CHOLEST SERPL-MCNC: 176 MG/DL (ref 0–199)
EST. AVERAGE GLUCOSE BLD GHB EST-MCNC: 102.5 MG/DL
HBA1C MFR BLD: 5.2 %
HDLC SERPL-MCNC: 41 MG/DL (ref 40–60)
LDL CHOLESTEROL: 100 MG/DL
PSA SERPL DL<=0.01 NG/ML-MCNC: 2.25 NG/ML (ref 0–4)
TRIGL SERPL-MCNC: 177 MG/DL (ref 0–150)
TSH SERPL DL<=0.005 MIU/L-ACNC: 2.03 UIU/ML (ref 0.27–4.2)
VLDLC SERPL CALC-MCNC: 35 MG/DL